# Patient Record
Sex: FEMALE | Race: WHITE | NOT HISPANIC OR LATINO
[De-identification: names, ages, dates, MRNs, and addresses within clinical notes are randomized per-mention and may not be internally consistent; named-entity substitution may affect disease eponyms.]

---
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
## 2021-08-12 ENCOUNTER — APPOINTMENT (OUTPATIENT)
Dept: PEDIATRIC CARDIOLOGY | Facility: CLINIC | Age: 21
End: 2021-08-12

## 2021-08-12 VITALS — HEART RATE: 91 BPM | OXYGEN SATURATION: 96 % | BODY MASS INDEX: 27.24 KG/M2 | HEIGHT: 62.01 IN | WEIGHT: 149.91 LBS

## 2021-08-12 DIAGNOSIS — I45.2 BIFASCICULAR BLOCK: ICD-10-CM

## 2021-08-12 DIAGNOSIS — U07.1 COVID-19: ICD-10-CM

## 2021-08-12 NOTE — REASON FOR VISIT
[Follow-Up] : a follow-up visit for [Ventricular Septal Defect] : a ventricular septal defect [Patient] : patient

## 2021-08-12 NOTE — PHYSICAL EXAM
[General Appearance - Alert] : alert [General Appearance - In No Acute Distress] : in no acute distress [General Appearance - Well Nourished] : well nourished [General Appearance - Well Developed] : well developed [General Appearance - Well-Appearing] : well appearing [Appearance Of Head] : the head was normocephalic [Facies] : there were no dysmorphic facial features [Sclera] : the conjunctiva were normal [Outer Ear] : the ears and nose were normal in appearance [Examination Of The Oral Cavity] : mucous membranes were moist and pink [Auscultation Breath Sounds / Voice Sounds] : breath sounds clear to auscultation bilaterally [Normal Chest Appearance] : the chest was normal in appearance [Sternotomy] : sternotomy [Well-Healed] : well-healed [Apical Impulse] : quiet precordium with normal apical impulse [Heart Rate And Rhythm] : normal heart rate and rhythm [Heart Sounds] : normal S1 and S2 [No Murmur] : no murmurs  [Heart Sounds Gallop] : no gallops [Heart Sounds Pericardial Friction Rub] : no pericardial rub [Heart Sounds Click] : no clicks [Arterial Pulses] : normal upper and lower extremity pulses with no pulse delay [Edema] : no edema [Capillary Refill Test] : normal capillary refill [Bowel Sounds] : normal bowel sounds [Abdomen Soft] : soft [Nondistended] : nondistended [Abdomen Tenderness] : non-tender [Nail Clubbing] : no clubbing  or cyanosis of the fingers [Motor Tone] : normal muscle strength and tone [Cervical Lymph Nodes Enlarged Anterior] : The anterior cervical nodes were normal [Cervical Lymph Nodes Enlarged Posterior] : The posterior cervical nodes were normal [] : no rash [Skin Lesions] : no lesions [Skin Turgor] : normal turgor [Demonstrated Behavior - Infant Nonreactive To Parents] : interactive [Mood] : mood and affect were appropriate for age [Demonstrated Behavior] : normal behavior

## 2021-08-16 RX ORDER — METRONIDAZOLE 375 MG/1
CAPSULE ORAL
Refills: 0 | Status: ACTIVE | COMMUNITY

## 2021-08-17 NOTE — CARDIOLOGY SUMMARY
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 [de-identified] :  \par Aug 12, 2021 [FreeTextEntry2] : Summary:\par 1. Status post patch closure of perimembranous ventricular septal defect.\par 2. No residual ventricular septal defect.\par 3. No evidence of aortic valve regurgitation.\par 4. Normal aortic root.\par 5. Aortic sinuses of Valsalva dimension (systole) = 2.68 cm (z = -0.30).\par 6. No evidence of pulmonary hypertension.\par 7. Pulmonary artery pressure estimate is based on tricuspid regurgitation peak systolic instantaneous\par gradient and interventricular septal systolic configuration.\par 8. Normal left ventricular size, morphology and systolic function.\par 9. Normal right ventricular morphology with qualitatively normal size and systolic function.\par 10. No pericardial effusion. [de-identified] :  \par Aug 12, 2021 placed

## 2021-08-17 NOTE — REVIEW OF SYSTEMS
[Feeling Poorly] : not feeling poorly (malaise) [Fever] : no fever [Wgt Loss (___ Lbs)] : no recent weight loss [Pallor] : not pale [Eye Discharge] : no eye discharge [Redness] : no redness [Change in Vision] : no change in vision [Nasal Stuffiness] : no nasal congestion [Sore Throat] : no sore throat [Earache] : no earache [Loss Of Hearing] : no hearing loss [Cyanosis] : no cyanosis [Edema] : no edema [Chest Pain] : no chest pain or discomfort [Diaphoresis] : not diaphoretic [Exercise Intolerance] : no persistence of exercise intolerance [Palpitations] : no palpitations [Orthopnea] : no orthopnea [Fast HR] : no tachycardia [Tachypnea] : not tachypneic [Wheezing] : no wheezing [Shortness Of Breath] : not expressed as feeling short of breath [Cough] : no cough [Vomiting] : no vomiting [Diarrhea] : no diarrhea [Abdominal Pain] : no abdominal pain [Decrease In Appetite] : appetite not decreased [Fainting (Syncope)] : no fainting [Seizure] : no seizures [Headache] : no headache [Dizziness] : no dizziness [Joint Pains] : no arthralgias [Limping] : no limping [Joint Swelling] : no joint swelling [Rash] : no rash [Wound problems] : no wound problems [Easy Bruising] : no tendency for easy bruising [Swollen Glands] : no lymphadenopathy [Nosebleeds] : no epistaxis [Easy Bleeding] : no ~M tendency for easy bleeding [Sleep Disturbances] : ~T no sleep disturbances [Hyperactive] : no hyperactive behavior [Depression] : no depression [Anxiety] : no anxiety [Failure To Thrive] : no failure to thrive [Short Stature] : short stature was not noted [Jitteriness] : no jitteriness [Heat/Cold Intolerance] : no temperature intolerance [Dec Urine Output] : no oliguria [FreeTextEntry2] : wears glasses [FreeTextEntry1] : had COVID 19 in January 2021

## 2021-08-17 NOTE — HISTORY OF PRESENT ILLNESS
[FreeTextEntry1] : We had the opportunity to examine Alexia, a 21 year-old female status post open heart surgery for patch closure of a ventricular septal defect in 2001 by Dr. Navarro. She attends [employer] in [place] and is studying health care management. She denies any cardiovascular complaints such as cyanosis, chest pain, chronic cough, excessive sweating, or exercise intolerance. She had COVID in January of 2021. She does no want to be vaccinated for COVID 19. She takes oral contraception, Azurette for heavy menstrual bleeding.

## 2021-08-17 NOTE — CONSULT LETTER
[Today's Date] : [unfilled] [Name] : Name: [unfilled] [] : : ~~ [Today's Date:] : [unfilled] [Dear  ___:] : Dear Dr. [unfilled]: [Consult - Single Provider] : Thank you very much for allowing me to participate in the care of this patient. If you have any questions, please do not hesitate to contact me. [Sincerely,] : Sincerely, [DrSatya  ___] : Dr. MCFARLAND [DrSatya ___] : Dr. MCFARLAND [FreeTextEntry4] : Dr. Cristhian Shi [FreeTextEntry5] : [address] [facility] [FreeTextEntry6] : [place] [id number] [de-identified] : Vinh Nunez MD, FAAP, FACC, FAHA\par Chief Emeritus, Division of Pediatric Cardiology\par [facility]\par Professor, Department of Pediatrics, [facility]\par  [FreeTextEntry7] : [phone]

## 2021-08-17 NOTE — DISCUSSION/SUMMARY
[PE + No Restrictions] : [unfilled] may participate in the entire physical education program without restriction, including all varsity competitive sports. [There are no changes in medication management.] : There are no changes in medication management [Needs SBE Prophylaxis] : [unfilled] does not need bacterial endocarditis prophylaxis [FreeTextEntry1] : In summary, Alexia is a 21  year old status post VSD repair with excellent results. We are pleased with Alexia's cardiovascular examination. She has a right bundle branch block secondary to her surgery. There is no need for endocarditis precautions or exercise restrictions given her normal echocardiogram and exercise stress test from 2013. She does not want to get vaccinated for COVID and I ordered an antibody test to assess her immunity. Additional blood work includes: CBC, CMP, CRP, lipid profile and COVID 19 antibody. In addition, I have placed a Holter recording on her.  I have transitioned for her to [facility]. A followup visit in 2 years is recommended. She should see her pediatrician regarding her fatigue and lightheadedness, and in the meantime we have ordered thyroid function studies.